# Patient Record
Sex: MALE | URBAN - METROPOLITAN AREA
[De-identification: names, ages, dates, MRNs, and addresses within clinical notes are randomized per-mention and may not be internally consistent; named-entity substitution may affect disease eponyms.]

---

## 2019-07-09 ENCOUNTER — APPOINTMENT (RX ONLY)
Dept: URBAN - METROPOLITAN AREA CLINIC 96 | Facility: CLINIC | Age: 29
Setting detail: DERMATOLOGY
End: 2019-07-09

## 2019-07-09 DIAGNOSIS — L21.8 OTHER SEBORRHEIC DERMATITIS: ICD-10-CM

## 2019-07-09 PROCEDURE — ? EDUCATIONAL RESOURCES PROVIDED

## 2019-07-09 PROCEDURE — ? TREATMENT REGIMEN

## 2019-07-09 PROCEDURE — ? COUNSELING

## 2019-07-09 PROCEDURE — 99202 OFFICE O/P NEW SF 15 MIN: CPT

## 2019-07-09 PROCEDURE — ? PRESCRIPTION

## 2019-07-09 RX ORDER — NYSTATIN AND TRIAMCINOLONE ACETONIDE 100000; 1 [USP'U]/G; MG/G
CREAM TOPICAL QD
Qty: 1 | Refills: 1 | Status: ERX | COMMUNITY
Start: 2019-07-09

## 2019-07-09 RX ORDER — KETOCONAZOLE 20 MG/ML
SHAMPOO TOPICAL QOD
Qty: 1 | Refills: 6 | Status: ERX | COMMUNITY
Start: 2019-07-09

## 2019-07-09 RX ADMIN — KETOCONAZOLE: 20 SHAMPOO TOPICAL at 14:48

## 2019-07-09 RX ADMIN — NYSTATIN AND TRIAMCINOLONE ACETONIDE: 100000; 1 CREAM TOPICAL at 14:45

## 2019-07-09 ASSESSMENT — LOCATION SIMPLE DESCRIPTION DERM
LOCATION SIMPLE: RIGHT ANTERIOR NECK
LOCATION SIMPLE: RIGHT SUBMANDIBULAR AREA
LOCATION SIMPLE: RIGHT EAR
LOCATION SIMPLE: POSTERIOR SCALP
LOCATION SIMPLE: LEFT EAR

## 2019-07-09 ASSESSMENT — SEVERITY ASSESSMENT: HOW SEVERE IS THIS PATIENT'S CONDITION?: MILD

## 2019-07-09 ASSESSMENT — LOCATION DETAILED DESCRIPTION DERM
LOCATION DETAILED: RIGHT INFERIOR LATERAL NECK
LOCATION DETAILED: LEFT CAVUM CONCHA
LOCATION DETAILED: RIGHT CAVUM CONCHA
LOCATION DETAILED: RIGHT INFERIOR OCCIPITAL SCALP
LOCATION DETAILED: RIGHT SUBMANDIBULAR AREA
LOCATION DETAILED: RIGHT SUPERIOR ANTERIOR NECK
LOCATION DETAILED: LEFT INFERIOR POSTAURICULAR SKIN

## 2019-07-09 ASSESSMENT — LOCATION ZONE DERM
LOCATION ZONE: FACE
LOCATION ZONE: NECK
LOCATION ZONE: EAR
LOCATION ZONE: SCALP

## 2019-07-09 NOTE — PROCEDURE: TREATMENT REGIMEN
Detail Level: Zone
Plan: Discussed diagnosis in detail with patient.
Initiate Treatment: Nystatin and Triamcinolone Cream to AA of beard BID x 10 days, 2 weeks off, 10 days again\\nNizoral shampoo to use on scalp, ears and beard area QOD, leave on 3 min, rinse.